# Patient Record
Sex: MALE | NOT HISPANIC OR LATINO | Employment: FULL TIME | URBAN - METROPOLITAN AREA
[De-identification: names, ages, dates, MRNs, and addresses within clinical notes are randomized per-mention and may not be internally consistent; named-entity substitution may affect disease eponyms.]

---

## 2020-07-23 ENCOUNTER — OFFICE VISIT CONVERTED (OUTPATIENT)
Dept: NEUROSURGERY | Facility: CLINIC | Age: 35
End: 2020-07-23
Attending: PHYSICIAN ASSISTANT

## 2020-08-06 ENCOUNTER — OFFICE VISIT CONVERTED (OUTPATIENT)
Dept: NEUROSURGERY | Facility: CLINIC | Age: 35
End: 2020-08-06
Attending: PHYSICIAN ASSISTANT

## 2020-08-20 ENCOUNTER — OFFICE VISIT CONVERTED (OUTPATIENT)
Dept: NEUROSURGERY | Facility: CLINIC | Age: 35
End: 2020-08-20
Attending: PHYSICIAN ASSISTANT

## 2020-09-04 ENCOUNTER — HOSPITAL ENCOUNTER (OUTPATIENT)
Dept: PREADMISSION TESTING | Facility: HOSPITAL | Age: 35
Discharge: HOME | End: 2020-09-04
Attending: NEUROLOGICAL SURGERY

## 2020-09-05 LAB — SARS-COV-2 RNA SPEC QL NAA+PROBE: NOT DETECTED

## 2020-09-09 ENCOUNTER — HOSPITAL ENCOUNTER (OUTPATIENT)
Dept: PERIOP | Facility: HOSPITAL | Age: 35
Setting detail: HOSPITAL OUTPATIENT SURGERY
Discharge: HOME | End: 2020-09-09
Attending: NEUROLOGICAL SURGERY

## 2020-09-29 ENCOUNTER — CONVERSION ENCOUNTER (OUTPATIENT)
Dept: NEUROLOGY | Facility: CLINIC | Age: 35
End: 2020-09-29

## 2020-09-29 ENCOUNTER — OFFICE VISIT CONVERTED (OUTPATIENT)
Dept: NEUROSURGERY | Facility: CLINIC | Age: 35
End: 2020-09-29
Attending: PHYSICIAN ASSISTANT

## 2020-10-16 ENCOUNTER — OFFICE VISIT CONVERTED (OUTPATIENT)
Dept: NEUROSURGERY | Facility: CLINIC | Age: 35
End: 2020-10-16
Attending: PHYSICIAN ASSISTANT

## 2020-10-16 ENCOUNTER — CONVERSION ENCOUNTER (OUTPATIENT)
Dept: NEUROLOGY | Facility: CLINIC | Age: 35
End: 2020-10-16

## 2020-11-11 ENCOUNTER — OFFICE VISIT CONVERTED (OUTPATIENT)
Dept: NEUROSURGERY | Facility: CLINIC | Age: 35
End: 2020-11-11
Attending: PHYSICIAN ASSISTANT

## 2021-01-25 ENCOUNTER — OFFICE VISIT (OUTPATIENT)
Dept: GASTROENTEROLOGY | Facility: CLINIC | Age: 36
End: 2021-01-25

## 2021-01-25 VITALS — HEIGHT: 72 IN | WEIGHT: 199 LBS | BODY MASS INDEX: 26.95 KG/M2 | TEMPERATURE: 98.5 F

## 2021-01-25 DIAGNOSIS — R10.30 LOWER ABDOMINAL PAIN: ICD-10-CM

## 2021-01-25 DIAGNOSIS — K59.1 FUNCTIONAL DIARRHEA: Primary | ICD-10-CM

## 2021-01-25 PROCEDURE — 99204 OFFICE O/P NEW MOD 45 MIN: CPT | Performed by: INTERNAL MEDICINE

## 2021-01-25 NOTE — PROGRESS NOTES
Chief Complaint   Patient presents with   • Diarrhea     Subjective     HPI  Jan Garcia is a 35 y.o. male who presents today for new office visit  35-year-old gentleman with greater than 10 years of diarrhea, loose stools  He also describes lower abdominal cramping that does not radiate worse after eating better after passing gas and there a bowel movement  There has been no weight loss  No upper GI symptoms such as nausea or vomiting  He has no rectal bleeding    Objective   Vitals:    01/25/21 1448   Temp: 98.5 °F (36.9 °C)       Physical Exam  Vitals signs and nursing note reviewed.   Constitutional:       Appearance: He is well-developed.   HENT:      Head: Normocephalic and atraumatic.   Eyes:      Conjunctiva/sclera: Conjunctivae normal.   Neck:      Musculoskeletal: Normal range of motion.      Trachea: No tracheal deviation.   Pulmonary:      Effort: Pulmonary effort is normal. No respiratory distress.   Abdominal:      General: Bowel sounds are normal. There is no distension.      Palpations: Abdomen is soft. There is no mass.      Tenderness: There is no abdominal tenderness. There is no guarding or rebound.   Genitourinary:     Rectum: Normal. No anal fissure, external hemorrhoid or internal hemorrhoid. Normal anal tone.   Musculoskeletal: Normal range of motion.   Skin:     General: Skin is warm and dry.   Neurological:      Mental Status: He is alert and oriented to person, place, and time.   Psychiatric:         Judgment: Judgment normal.         Assessment/Plan   Assessment:     Diarrhea  Abdominal pain    Plan:     We will perform a CBC, CMP and celiac profile  I will schedule him for colonoscopy with intubation of the terminal ileum to look for ileal Crohn's disease, and biopsies of the entire colon to look for microscopic colitis  If the diagnosis here really is IBS-D I would envision treating him with Xifaxan for 2 weeks followed by a probiotic for 1 month  In the future he may need  antispasmodics versus TCA for IBS-D  Consider low FODMAP diet in the future    I spent 45 minutes caring for Jan on this date of service. This time includes time spent by me in the following activities:preparing for the visit, reviewing tests, obtaining and/or reviewing a separately obtained history, performing a medically appropriate examination and/or evaluation , counseling and educating the patient/family/caregiver, ordering medications, tests, or procedures, referring and communicating with other health care professionals , independently interpreting results and communicating that information with the patient/family/caregiver and care coordination    Dominic Taylor MD  [facility]  [address] [place] [address]  Office: [phone]

## 2021-01-26 ENCOUNTER — PREP FOR SURGERY (OUTPATIENT)
Dept: OTHER | Facility: HOSPITAL | Age: 36
End: 2021-01-26

## 2021-01-26 DIAGNOSIS — K90.0 CELIAC DISEASE: Primary | ICD-10-CM

## 2021-01-26 LAB
ALBUMIN SERPL-MCNC: 4.5 G/DL (ref 3.5–5.2)
ALBUMIN/GLOB SERPL: 2.4 G/DL
ALP SERPL-CCNC: 66 U/L (ref 39–117)
ALT SERPL-CCNC: 22 U/L (ref 1–41)
AST SERPL-CCNC: 22 U/L (ref 1–40)
BASOPHILS # BLD AUTO: 0.02 10*3/MM3 (ref 0–0.2)
BASOPHILS NFR BLD AUTO: 0.4 % (ref 0–1.5)
BILIRUB SERPL-MCNC: 0.7 MG/DL (ref 0–1.2)
BUN SERPL-MCNC: 15 MG/DL (ref 6–20)
BUN/CREAT SERPL: 13.8 (ref 7–25)
CALCIUM SERPL-MCNC: 9.6 MG/DL (ref 8.6–10.5)
CHLORIDE SERPL-SCNC: 104 MMOL/L (ref 98–107)
CO2 SERPL-SCNC: 25.3 MMOL/L (ref 22–29)
CREAT SERPL-MCNC: 1.09 MG/DL (ref 0.76–1.27)
ENDOMYSIUM IGA SER QL: NEGATIVE
EOSINOPHIL # BLD AUTO: 0.26 10*3/MM3 (ref 0–0.4)
EOSINOPHIL NFR BLD AUTO: 5.5 % (ref 0.3–6.2)
ERYTHROCYTE [DISTWIDTH] IN BLOOD BY AUTOMATED COUNT: 12.1 % (ref 12.3–15.4)
GLIADIN PEPTIDE IGA SER-ACNC: 8 UNITS (ref 0–19)
GLIADIN PEPTIDE IGG SER-ACNC: 3 UNITS (ref 0–19)
GLOBULIN SER CALC-MCNC: 1.9 GM/DL
GLUCOSE SERPL-MCNC: 106 MG/DL (ref 65–99)
HCT VFR BLD AUTO: 48.1 % (ref 37.5–51)
HGB BLD-MCNC: 15.9 G/DL (ref 13–17.7)
IGA SERPL-MCNC: 141 MG/DL (ref 90–386)
IMM GRANULOCYTES # BLD AUTO: 0.01 10*3/MM3 (ref 0–0.05)
IMM GRANULOCYTES NFR BLD AUTO: 0.2 % (ref 0–0.5)
LYMPHOCYTES # BLD AUTO: 1.39 10*3/MM3 (ref 0.7–3.1)
LYMPHOCYTES NFR BLD AUTO: 29.1 % (ref 19.6–45.3)
MCH RBC QN AUTO: 29.3 PG (ref 26.6–33)
MCHC RBC AUTO-ENTMCNC: 33.1 G/DL (ref 31.5–35.7)
MCV RBC AUTO: 88.7 FL (ref 79–97)
MONOCYTES # BLD AUTO: 0.46 10*3/MM3 (ref 0.1–0.9)
MONOCYTES NFR BLD AUTO: 9.6 % (ref 5–12)
NEUTROPHILS # BLD AUTO: 2.63 10*3/MM3 (ref 1.7–7)
NEUTROPHILS NFR BLD AUTO: 55.2 % (ref 42.7–76)
NRBC BLD AUTO-RTO: 0 /100 WBC (ref 0–0.2)
PLATELET # BLD AUTO: 278 10*3/MM3 (ref 140–450)
POTASSIUM SERPL-SCNC: 4.3 MMOL/L (ref 3.5–5.2)
PROT SERPL-MCNC: 6.4 G/DL (ref 6–8.5)
RBC # BLD AUTO: 5.42 10*6/MM3 (ref 4.14–5.8)
SODIUM SERPL-SCNC: 142 MMOL/L (ref 136–145)
TTG IGA SER-ACNC: <2 U/ML (ref 0–3)
TTG IGG SER-ACNC: 10 U/ML (ref 0–5)
WBC # BLD AUTO: 4.77 10*3/MM3 (ref 3.4–10.8)

## 2021-01-26 NOTE — PROGRESS NOTES
1 antibody positive on the celiac panel  Please add EGD to his already scheduled colonoscopy  Case request is in for EGD

## 2021-02-17 ENCOUNTER — TRANSCRIBE ORDERS (OUTPATIENT)
Dept: ADMINISTRATIVE | Facility: HOSPITAL | Age: 36
End: 2021-02-17

## 2021-02-17 DIAGNOSIS — Z01.818 OTHER SPECIFIED PRE-OPERATIVE EXAMINATION: Primary | ICD-10-CM

## 2021-02-19 ENCOUNTER — TELEPHONE (OUTPATIENT)
Dept: GASTROENTEROLOGY | Facility: CLINIC | Age: 36
End: 2021-02-19

## 2021-02-19 ENCOUNTER — PREP FOR SURGERY (OUTPATIENT)
Dept: OTHER | Facility: HOSPITAL | Age: 36
End: 2021-02-19

## 2021-02-19 ENCOUNTER — LAB (OUTPATIENT)
Dept: LAB | Facility: HOSPITAL | Age: 36
End: 2021-02-19

## 2021-02-19 DIAGNOSIS — Z01.818 OTHER SPECIFIED PRE-OPERATIVE EXAMINATION: ICD-10-CM

## 2021-02-19 PROCEDURE — C9803 HOPD COVID-19 SPEC COLLECT: HCPCS

## 2021-02-19 PROCEDURE — U0004 COV-19 TEST NON-CDC HGH THRU: HCPCS

## 2021-02-19 NOTE — TELEPHONE ENCOUNTER
----- Message from Javan Colbert Rep sent at 2/18/2021  1:33 PM EST -----  Regarding: EGD added  Mandy,    This pt had a EGD added. Maki at  says they only show the colonoscopy. I submitted both to [employer]. The colonoscopy was approved. The EGD is pending. I let Maki know!  Thanks  Cis

## 2021-02-20 LAB — SARS-COV-2 ORF1AB RESP QL NAA+PROBE: NOT DETECTED

## 2021-02-22 ENCOUNTER — ANESTHESIA (OUTPATIENT)
Dept: GASTROENTEROLOGY | Facility: HOSPITAL | Age: 36
End: 2021-02-22

## 2021-02-22 ENCOUNTER — ANESTHESIA EVENT (OUTPATIENT)
Dept: GASTROENTEROLOGY | Facility: HOSPITAL | Age: 36
End: 2021-02-22

## 2021-02-22 ENCOUNTER — HOSPITAL ENCOUNTER (OUTPATIENT)
Facility: HOSPITAL | Age: 36
Setting detail: HOSPITAL OUTPATIENT SURGERY
Discharge: HOME | End: 2021-02-22
Attending: INTERNAL MEDICINE | Admitting: INTERNAL MEDICINE

## 2021-02-22 VITALS
TEMPERATURE: 98.1 F | BODY MASS INDEX: 26.37 KG/M2 | DIASTOLIC BLOOD PRESSURE: 81 MMHG | RESPIRATION RATE: 16 BRPM | OXYGEN SATURATION: 100 % | HEART RATE: 55 BPM | WEIGHT: 194.7 LBS | HEIGHT: 72 IN | SYSTOLIC BLOOD PRESSURE: 116 MMHG

## 2021-02-22 DIAGNOSIS — R10.30 LOWER ABDOMINAL PAIN: ICD-10-CM

## 2021-02-22 DIAGNOSIS — K59.1 FUNCTIONAL DIARRHEA: ICD-10-CM

## 2021-02-22 PROCEDURE — 88305 TISSUE EXAM BY PATHOLOGIST: CPT | Performed by: INTERNAL MEDICINE

## 2021-02-22 PROCEDURE — 25010000002 PROPOFOL 10 MG/ML EMULSION: Performed by: ANESTHESIOLOGY

## 2021-02-22 PROCEDURE — 43239 EGD BIOPSY SINGLE/MULTIPLE: CPT | Performed by: INTERNAL MEDICINE

## 2021-02-22 PROCEDURE — 45380 COLONOSCOPY AND BIOPSY: CPT | Performed by: INTERNAL MEDICINE

## 2021-02-22 RX ORDER — HYDROMORPHONE HCL 110MG/55ML
0.5 PATIENT CONTROLLED ANALGESIA SYRINGE INTRAVENOUS
Status: DISCONTINUED | OUTPATIENT
Start: 2021-02-22 | End: 2021-02-22 | Stop reason: HOSPADM

## 2021-02-22 RX ORDER — SODIUM CHLORIDE, SODIUM LACTATE, POTASSIUM CHLORIDE, CALCIUM CHLORIDE 600; 310; 30; 20 MG/100ML; MG/100ML; MG/100ML; MG/100ML
1000 INJECTION, SOLUTION INTRAVENOUS CONTINUOUS
Status: DISCONTINUED | OUTPATIENT
Start: 2021-02-22 | End: 2021-02-22 | Stop reason: HOSPADM

## 2021-02-22 RX ORDER — LABETALOL HYDROCHLORIDE 5 MG/ML
5 INJECTION, SOLUTION INTRAVENOUS
Status: DISCONTINUED | OUTPATIENT
Start: 2021-02-22 | End: 2021-02-22 | Stop reason: HOSPADM

## 2021-02-22 RX ORDER — PROPOFOL 10 MG/ML
VIAL (ML) INTRAVENOUS AS NEEDED
Status: DISCONTINUED | OUTPATIENT
Start: 2021-02-22 | End: 2021-02-22 | Stop reason: SURG

## 2021-02-22 RX ORDER — EPHEDRINE SULFATE 50 MG/ML
5 INJECTION, SOLUTION INTRAVENOUS ONCE AS NEEDED
Status: DISCONTINUED | OUTPATIENT
Start: 2021-02-22 | End: 2021-02-22 | Stop reason: HOSPADM

## 2021-02-22 RX ORDER — LIDOCAINE HYDROCHLORIDE 10 MG/ML
0.5 INJECTION, SOLUTION INFILTRATION; PERINEURAL ONCE AS NEEDED
Status: DISCONTINUED | OUTPATIENT
Start: 2021-02-22 | End: 2021-02-22 | Stop reason: HOSPADM

## 2021-02-22 RX ORDER — HYDROCODONE BITARTRATE AND ACETAMINOPHEN 7.5; 325 MG/1; MG/1
1 TABLET ORAL ONCE AS NEEDED
Status: DISCONTINUED | OUTPATIENT
Start: 2021-02-22 | End: 2021-02-22 | Stop reason: HOSPADM

## 2021-02-22 RX ORDER — DIPHENHYDRAMINE HCL 25 MG
25 CAPSULE ORAL
Status: DISCONTINUED | OUTPATIENT
Start: 2021-02-22 | End: 2021-02-22 | Stop reason: HOSPADM

## 2021-02-22 RX ORDER — PROMETHAZINE HYDROCHLORIDE 25 MG/1
25 SUPPOSITORY RECTAL ONCE AS NEEDED
Status: DISCONTINUED | OUTPATIENT
Start: 2021-02-22 | End: 2021-02-22 | Stop reason: HOSPADM

## 2021-02-22 RX ORDER — LIDOCAINE HYDROCHLORIDE 20 MG/ML
INJECTION, SOLUTION INFILTRATION; PERINEURAL AS NEEDED
Status: DISCONTINUED | OUTPATIENT
Start: 2021-02-22 | End: 2021-02-22 | Stop reason: SURG

## 2021-02-22 RX ORDER — NALOXONE HCL 0.4 MG/ML
0.2 VIAL (ML) INJECTION AS NEEDED
Status: DISCONTINUED | OUTPATIENT
Start: 2021-02-22 | End: 2021-02-22 | Stop reason: HOSPADM

## 2021-02-22 RX ORDER — PROPOFOL 10 MG/ML
VIAL (ML) INTRAVENOUS CONTINUOUS PRN
Status: DISCONTINUED | OUTPATIENT
Start: 2021-02-22 | End: 2021-02-22 | Stop reason: SURG

## 2021-02-22 RX ORDER — OXYCODONE AND ACETAMINOPHEN 7.5; 325 MG/1; MG/1
1 TABLET ORAL ONCE AS NEEDED
Status: DISCONTINUED | OUTPATIENT
Start: 2021-02-22 | End: 2021-02-22 | Stop reason: HOSPADM

## 2021-02-22 RX ORDER — SODIUM CHLORIDE 0.9 % (FLUSH) 0.9 %
10 SYRINGE (ML) INJECTION AS NEEDED
Status: DISCONTINUED | OUTPATIENT
Start: 2021-02-22 | End: 2021-02-22 | Stop reason: HOSPADM

## 2021-02-22 RX ORDER — FLUMAZENIL 0.1 MG/ML
0.2 INJECTION INTRAVENOUS AS NEEDED
Status: DISCONTINUED | OUTPATIENT
Start: 2021-02-22 | End: 2021-02-22 | Stop reason: HOSPADM

## 2021-02-22 RX ORDER — DIPHENHYDRAMINE HYDROCHLORIDE 50 MG/ML
12.5 INJECTION INTRAMUSCULAR; INTRAVENOUS
Status: DISCONTINUED | OUTPATIENT
Start: 2021-02-22 | End: 2021-02-22 | Stop reason: HOSPADM

## 2021-02-22 RX ORDER — ONDANSETRON 2 MG/ML
4 INJECTION INTRAMUSCULAR; INTRAVENOUS ONCE AS NEEDED
Status: DISCONTINUED | OUTPATIENT
Start: 2021-02-22 | End: 2021-02-22 | Stop reason: HOSPADM

## 2021-02-22 RX ORDER — PROMETHAZINE HYDROCHLORIDE 25 MG/1
25 TABLET ORAL ONCE AS NEEDED
Status: DISCONTINUED | OUTPATIENT
Start: 2021-02-22 | End: 2021-02-22 | Stop reason: HOSPADM

## 2021-02-22 RX ORDER — FENTANYL CITRATE 50 UG/ML
50 INJECTION, SOLUTION INTRAMUSCULAR; INTRAVENOUS
Status: DISCONTINUED | OUTPATIENT
Start: 2021-02-22 | End: 2021-02-22 | Stop reason: HOSPADM

## 2021-02-22 RX ADMIN — PROPOFOL 100 MG: 10 INJECTION, EMULSION INTRAVENOUS at 09:59

## 2021-02-22 RX ADMIN — PROPOFOL 140 MCG/KG/MIN: 10 INJECTION, EMULSION INTRAVENOUS at 09:59

## 2021-02-22 RX ADMIN — LIDOCAINE HYDROCHLORIDE 100 MG: 20 INJECTION, SOLUTION INFILTRATION; PERINEURAL at 09:58

## 2021-02-22 RX ADMIN — SODIUM CHLORIDE, POTASSIUM CHLORIDE, SODIUM LACTATE AND CALCIUM CHLORIDE 1000 ML: 600; 310; 30; 20 INJECTION, SOLUTION INTRAVENOUS at 09:55

## 2021-02-22 NOTE — ANESTHESIA PREPROCEDURE EVALUATION
" Anesthesia Evaluation     Patient summary reviewed and Nursing notes reviewed   no history of anesthetic complications:  NPO Solid Status: > 8 hours  NPO Liquid Status: > 2 hours           Airway   Mallampati: I  TM distance: >3 FB  Neck ROM: full  Dental - normal exam     Pulmonary - normal exam   (+) a smoker Former,   Cardiovascular - negative cardio ROS and normal exam  Exercise tolerance: good (4-7 METS)    Rhythm: regular        Neuro/Psych  (+) psychiatric history Anxiety and ADHD,     GI/Hepatic/Renal/Endo - negative ROS     Musculoskeletal (-) negative ROS    Abdominal  - normal exam    Bowel sounds: normal.   Substance History - negative use     OB/GYN negative ob/gyn ROS         Other                        Anesthesia Plan    ASA 2     MAC   (/83 (BP Location: Left arm, Patient Position: Sitting)   Pulse 56   Temp 36.7 °C (98.1 °F) (Oral)   Resp 16   Ht 182.9 cm (72\")   Wt 88.3 kg (194 lb 11.2 oz)   SpO2 96%   BMI 26.41 kg/m²     I have reviewed the patient's history with the patient and the chart, including all pertinent laboratory results and imaging. I have explained the risks of anesthesia including but not limited to dental damage, corneal abrasion, nerve injury, MI, stroke, and death.  )    Anesthetic plan, all risks, benefits, and alternatives have been provided, discussed and informed consent has been obtained with: patient.      "

## 2021-02-22 NOTE — DISCHARGE INSTRUCTIONS

## 2021-02-22 NOTE — ANESTHESIA POSTPROCEDURE EVALUATION
"Patient: Jan Garcia    Procedure Summary     Date: 02/22/21 Room / Location: [facility] ENDOSCOPY 8 / [facility] ENDOSCOPY    Anesthesia Start: 0957 Anesthesia Stop: 1021    Procedures:       COLONOSCOPY to cecum and TI with cold bx and cold polypectomy (N/A )      ESOPHAGOGASTRODUODENOSCOPY with cold bx (N/A Esophagus) Diagnosis:       Functional diarrhea      Lower abdominal pain      (Functional diarrhea [K59.1])      (Lower abdominal pain [R10.30])    Surgeon: Dominic Taylor MD Provider: Rufina Lloyd MD    Anesthesia Type: MAC ASA Status: 2          Anesthesia Type: MAC    Vitals  Vitals Value Taken Time   BP 97/62 02/22/21 1024   Temp     Pulse 56 02/22/21 1024   Resp 16 02/22/21 1024   SpO2 92 % 02/22/21 1024           Post Anesthesia Care and Evaluation    Patient location during evaluation: PACU  Patient participation: complete - patient participated  Level of consciousness: sleepy but conscious  Pain management: adequate  Airway patency: patent  Anesthetic complications: No anesthetic complications    Cardiovascular status: acceptable  Respiratory status: acceptable  Hydration status: acceptable    Comments: BP 97/62   Pulse 56   Temp 36.7 °C (98.1 °F) (Oral)   Resp 16   Ht 182.9 cm (72\")   Wt 88.3 kg (194 lb 11.2 oz)   SpO2 92%   BMI 26.41 kg/m²         "

## 2021-02-23 LAB
CYTO UR: NORMAL
LAB AP CASE REPORT: NORMAL
PATH REPORT.FINAL DX SPEC: NORMAL
PATH REPORT.GROSS SPEC: NORMAL

## 2021-02-23 NOTE — PROGRESS NOTES
Letter adenoma colon polyp  Colonoscopy recall 5 years  Continue IBgard as needed  Office visit Mary Kay next available

## 2021-04-01 ENCOUNTER — TELEPHONE (OUTPATIENT)
Dept: GASTROENTEROLOGY | Facility: CLINIC | Age: 36
End: 2021-04-01

## 2021-04-01 NOTE — TELEPHONE ENCOUNTER
----- Message from Dominic Taylor MD sent at 2/23/2021  1:56 PM EST -----  Letter adenoma colon polyp  Colonoscopy recall 5 years  Continue IBgard as needed  Office visit Mary Kay rowland

## 2021-04-01 NOTE — TELEPHONE ENCOUNTER
"Patient called. Advised as per Dr. Taylor's note. Patient states he was supposed to get a prescription medication from Dr. Taylor. Advised the medication IBgard is an over the counter medication and can be purchased at [employer] or [facility].   Patient then stated he was not happy he had not received a phone call from the office with a week of his procedure(he states \"someone\" told him this). Patient states again he was not happy and will not come back.   Patient was asked if he had called the office for his results. He stated, \"that is not my job\" and hung the phone up.   Update to Kisha Baez .   "

## 2021-04-05 NOTE — TELEPHONE ENCOUNTER
Dominic Tavera MD Haag, Shelley D, RN; Di Baez; Marichuy Patton, RN  Caller: Unspecified (4 days ago,  2:52 PM)  I think the most important thing to consider with this gentleman is at age 35 he had a precancerous polyp, we have now significantly reduced his risk of colon cancer by curing that precancerous polyp, plus it sets him up for another colonoscopy in 5 years to remove her more precancerous polyps if he has any, the fact that he made a precancerous polyp at such an early age increases his risk of making more precancerous polyps as his life goes on     if you would like to let him know that that would be great.  If you get a voicemail you can leave that on his voicemail    His diagnosis is likely IBS, I have ruled out many other diagnoses with the work-up of done so far, IBgard is a good start please let him know this    The FDA approved treatments for irritable bowel syndrome include xifaxan  And Lotronex and Viberzi     None of these will be needed if it is mild IBS controlled with IBgard... if it is not controlled with IBgard then we will start 1 of these medications likely Xifaxan    thanks     Called pt and left a msg on identified vm and advised of Dr. Taylor's note.  Advised pt to call back if he has questions.
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
## 2021-05-13 NOTE — PROGRESS NOTES
Progress Note      Patient Name: Jan Garcia   Patient ID: [id number]   Sex: Female   YOB: 1985    Referring Provider: [facility]    Visit Date: August 6, 2020    Provider: Tania Duarte PA-C   Location: [facility]   Location Address: [address] [place] [address]  [id number]   Location Phone: [phone]          Chief Complaint  · Follow-up      History Of Present Illness  The patient is a 34 year old female who is in the office for followup appointment.      He fractured his neck as a child and the fracture wasn't discovered until he was a teenager.  Neck pain constant and worse with prolonged sitting without a back rest.  He has more pain with neck extension.  Denies arm pain or weakness.  Has hand paresthesias that wakes him up at night.  Imaging of cervical spine was several years ago.     MRI lumbar spine showed a small right L3/4 foraminal disc protrusion.       Past Medical History  Arthritis; Back pain; Degenerative Disc Disease ; Leg pain; Migraines; Muscle cramps; Vascular disease, peripheral         Past Surgical History  Vasectomy         Medication List  Adderall 15 mg oral tablet; clonazepam 1 mg oral tablet; meloxicam 15 mg oral tablet         Allergy List  NO KNOWN DRUG ALLERGIES       Allergies Reconciled  Family Medical History  Family history of bleeding disorder; Family history of Arthritis; Family history of stroke; Family history of heart disease         Social History  Alcohol (Current some day); lives alone; Single; Tobacco (Former); Working         Review of Systems  · Constitutional  o Denies  o : chills, excessive sweating, fatigue, fever, sycope/passing out, weight gain, weight loss  · Eyes  o Denies  o : changes in vision, blurry vision, double vision  · HENT  o Admits  o : seasonal allergies  o Denies  o : loss of hearing, ringing in the ears, ear aches, sore throat, nasal congestion, sinus pain, nose  bleeds  · Cardiovascular  o Denies  o : blood clots, swollen legs, anemia, easy burising or bleeding, transfusions  · Respiratory  o Denies  o : shortness of breath, dry cough, productive cough, pneumonia, COPD  · Gastrointestinal  o Denies  o : difficulty swallowing, reflux  · Genitourinary  o Denies  o : incontinence  · Neurologic  o Admits  o : headache, difficulty with sleep, numbness/tingling/paresthesia , weakness  o Denies  o : seizure, stroke, tremor, loss of balance, falls, dizziness/vertigo, difficulty with dexterity  · Musculoskeletal  o Admits  o : neck stiffness/pain, muscle aches, joint pain, weakness, spasms, sciatica, pain radiating in leg, low back pain  o Denies  o : swollen lymph nodes, pain radiating in arm  · Endocrine  o Denies  o : diabetes, thyroid disorder  · Psychiatric  o Admits  o : anxiety, depression  · All Others Negative      Vitals  Date Time BP Position Site L\R Cuff Size HR RR TEMP (F) WT  HT  BMI kg/m2 BSA m2 O2 Sat        08/06/2020 11:11 AM        97.6 197lbs 5oz 6'   26.76 2.13           Physical Examination  · Constitutional  o Appearance  o : well-nourished, well developed, alert, in no acute distress  · Respiratory  o Respiratory Effort  o : breathing unlabored  · Cardiovascular  o Peripheral Vascular System  o :   § Extremities  § : no cyanosis, clubbing or edema  · Neurologic  o Mental Status Examination  o :   § Orientation  § : grossly oriented to person, place and time  o Motor Examination  o :   § RLE Strength  § : strength normal  § RLE Motor Function  § : tone normal, no atrophy, no abnormal movements noted  § LLE Strength  § : strength normal  § LLE Motor Function  § : tone normal, no atrophy, no abnormal movements noted  o Reflexes  o :   § RLE  § : 2/4 knee and ankle reflex  § LLE  § : 2/4 knee and ankle reflex  o Sensation  o :   § Light Touch  § : sensation intact to light touch in extremities  o Gait and Station  o :   § Gait Screening  § : gait within normal  limits  · Psychiatric  o Mood and Affect  o : mood normal, affect appropriate     Negative Phalen's and Tinel's at the wrists.  Motor 5/5 and sensation intact in the upper extremities.  Negative Hudson's.           Assessment  · Displacement of lumbar intervertebral disc     722.10/M51.26  · Lumbago/low back pain     724.3/M54.40  · Lumbar Spinal Stenosis     724.02/M48.06  · Paresthesia     782.0/R20.2  · Radiculopathy, lumbosacral     724.4/M54.16  · History of cervical fracture     V15.51/Z87.81  · Cervicalgia     723.1/M54.2    Problems Reconciled  Plan  · Orders  o MRI cervical spine w/o contrast (27616) - V15.51/Z87.81, 723.1/M54.2, 782.0/R20.2 - 08/06/2020   [facility] in [place] later afternoon appt.   · Medications  o Medications have been Reconciled  o Transition of Care or Provider Policy  · Instructions  o The ROS and the PFSH were reviewed at today's visit.  o Call or return to office if symptoms worsen or persist.   o I will order MRI cervical spine and f/u to discuss results. I will add to current work restrictions. Lumbar surgery not recommended.             Electronically Signed by: Tania Duarte PA-C -Author on August 6, 2020 12:01:26 PM

## 2021-05-13 NOTE — PROGRESS NOTES
Progress Note      Patient Name: Jan Garcia   Patient ID: [id number]   Sex: Female   YOB: 1985    Referring Provider: Shima Noland GYN    Visit Date: August 20, 2020    Provider: Tania Duarte PA-C   Location: [facility]   Location Address: [address] [place] [address]  [id number]   Location Phone: [phone]          Chief Complaint     Patient is here to discuss MRI results.       History Of Present Illness     MRI cervical spine showed mild foraminal stenosis from small disc protrusions at C5/6 and C6/7.  MRI lumbar spine discussed at last visit showed a right L3/4 foraminal disc protrusion.  Right leg pain has progressed and now down to the foot.  He missed work yesterday due to the pain and would like to take a leave from work and consider lumbar surgery.  He's failed LESB and PT. Hand paresthesias that wake him up at night and worse in the thumb, index and middle fingers.       Past Medical History  Arthritis; Back pain; Degenerative Disc Disease ; Leg pain; Migraines; Muscle cramps; Vascular disease, peripheral         Past Surgical History  Vasectomy         Medication List  Adderall 15 mg oral tablet; clonazepam 1 mg oral tablet; meloxicam 15 mg oral tablet         Allergy List  NO KNOWN DRUG ALLERGIES       Allergies Reconciled  Family Medical History  Family history of bleeding disorder; Family history of Arthritis; Family history of stroke; Family history of heart disease         Social History  Alcohol (Current some day); lives alone; Single; Tobacco (Former); Working         Review of Systems  · Constitutional  o Denies  o : chills, excessive sweating, fatigue, fever, sycope/passing out, weight gain, weight loss  · Eyes  o Denies  o : changes in vision, blurry vision, double vision  · HENT  o Admits  o : seasonal allergies  o Denies  o : loss of hearing, ringing in the ears, ear aches, sore throat, nasal congestion, sinus pain, nose  bleeds  · Cardiovascular  o Denies  o : blood clots, swollen legs, anemia, easy burising or bleeding, transfusions  · Respiratory  o Denies  o : shortness of breath, dry cough, productive cough, pneumonia, COPD  · Gastrointestinal  o Denies  o : difficulty swallowing, reflux  · Genitourinary  o Denies  o : incontinence  · Neurologic  o Admits  o : headache, difficulty with sleep, numbness/tingling/paresthesia , weakness  o Denies  o : seizure, stroke, tremor, loss of balance, falls, dizziness/vertigo, difficulty with dexterity  · Musculoskeletal  o Admits  o : neck stiffness/pain, muscle aches, joint pain, weakness, spasms, sciatica, pain radiating in leg, low back pain  o Denies  o : swollen lymph nodes, pain radiating in arm  · Endocrine  o Denies  o : diabetes, thyroid disorder  · Psychiatric  o Admits  o : anxiety, depression  · All Others Negative      Vitals  Date Time BP Position Site L\R Cuff Size HR RR TEMP (F) WT  HT  BMI kg/m2 BSA m2 O2 Sat        08/20/2020 03:43 PM        97.6 193lbs 0oz 6'   26.18 2.11           Physical Examination  · Constitutional  o Appearance  o : well-nourished, well developed, alert, in no acute distress  · Respiratory  o Respiratory Effort  o : breathing unlabored  · Cardiovascular  o Peripheral Vascular System  o :   § Extremities  § : no cyanosis, clubbing or edema  · Neurologic  o Mental Status Examination  o :   § Orientation  § : grossly oriented to person, place and time  o Motor Examination  o :   § RLE Strength  § : strength normal  § RLE Motor Function  § : tone normal, no atrophy, no abnormal movements noted  § LLE Strength  § : strength normal  § LLE Motor Function  § : tone normal, no atrophy, no abnormal movements noted  o Reflexes  o :   § RLE  § : 1/4 knee and ankle reflex  § LLE  § : 2/4 knee and ankle reflex  o Sensation  o :   § Light Touch  § : sensation intact to light touch in extremities  o Gait and Station  o :   § Gait Screening  § : gait within normal  limits  · Psychiatric  o Mood and Affect  o : mood normal, affect appropriate     Positive Phalen's at the wrists but negative Tinel's.   Motor 5/5 and sensation intact in the upper extremities.  Negative Hudson's.           Assessment  · Pre-op examination     V72.84/Z01.818  · Foraminal stenosis of lumbar region     724.02/M99.83  right L3/4  · Lumbago/low back pain     724.3/M54.40  · Lumbar disc herniation, L3-4     722.10/M51.26  right far lateral  · Lumbar Spinal Stenosis     724.02/M48.06  · Radiculopathy, lumbosacral     724.4/M54.16  · Cervicalgia     723.1/M54.2  · Hand paresthesia     782.0/R20.2      Plan  · Orders  o EMG/NCV of Upper Extremities Bilaterally (40361) - 723.1/M54.2 - 08/20/2020   concern for carpal tunnel  · Medications  o Medications have been Reconciled  o Transition of Care or Provider Policy  · Instructions  o ****Surgical Orders****  o Outpatient  o RISK AND BENEFITS:  o Possible risks/complications, benefits and alternatives to surgical or invasive procedure have been explained to the patient and/or legal guardian.  o Patient has been evaluated and can tolerate anesthesia and/or sedation. Risks, benefits, and alternatives to anesthesia and sedation have been explained to the patient and/or legal guardian.  o ***************  o PREP: Per protocol;  o IV: Per Anesthesia;  o *******************************************  o PRE- OP MEDICATION ORDER:  o *******************************************  o Kefzol 2 grams IV on call to OR.  o Date of Surgical Procedure:   o Please sign permit for:  o Lumbar Microdiscectomy, right approach at lumbar three to lumbar four for far lateral disc herniation  o The above History and Physical must have been completed within 30 days of admission.  o He will have an EMG/NCV of the upper extremities to evaluate for carpal tunnel. Small disc protrusions at C5/6 and C6/7 but surgery not recommended for the cervical spine. He has a right L3/4 foraminal disc  protrusion. Dr. Castaneda also saw him today in clinic. He discussed with him the risks and benefits of a right L3/4 MID for far lateral disc herniation. He has elected to proceed with surgery. Off work from today until 6 weeks after surgery.   · Associate Tasks  o Task ID [id number] General Task: please schedule patient for surgery            Electronically Signed by: LIBIA Walls-FALGUNI -Author on August 20, 2020 05:05:49 PM  Electronically Co-signed by: Aden Castaneda MD -Reviewer on August 25, 2020 09:18:15 AM

## 2021-05-13 NOTE — PROGRESS NOTES
Progress Note      Patient Name: Jan Garcia   Patient ID: [id number]   Sex: Male   YOB: 1985    Referring Provider: Shima Noland GYN    Visit Date: September 29, 2020    Provider: Nadeen Rodriguez PA-C   Location: [facility]   Location Address: [address] [place] [address]  [id number]   Location Phone: [phone]          Chief Complaint     Post Op follow up       History Of Present Illness     He had a right L3/4 MID. Pt notes that his right leg pain has been worse/severe since surgery. Surgery only helped low back pain. He denies signs of infection and fever.       Past Medical History  Arthritis; Back pain; Degenerative Disc Disease ; Leg pain; Migraines; Muscle cramps; Vascular disease, peripheral         Past Surgical History  Far lateral discectomy; Vasectomy         Medication List  Adderall 15 mg oral tablet; clonazepam 1 mg oral tablet; gabapentin 300 mg oral capsule; meloxicam 15 mg oral tablet         Allergy List  NO KNOWN DRUG ALLERGIES         Family Medical History  Family history of bleeding disorder; Family history of Arthritis; Family history of stroke; Family history of heart disease         Social History  Alcohol (Current some day); lives alone; Single; Tobacco (Former); Working         Review of Systems  · Constitutional  o Denies  o : chills, excessive sweating, fatigue, fever, sycope/passing out, weight gain, weight loss  · Eyes  o Denies  o : changes in vision, blurry vision, double vision  · HENT  o Denies  o : loss of hearing, ringing in the ears, ear aches, sore throat, nasal congestion, sinus pain, nose bleeds, seasonal allergies  · Cardiovascular  o Denies  o : blood clots, swollen legs, anemia, easy burising or bleeding, transfusions  · Respiratory  o Denies  o : shortness of breath, dry cough, productive cough, pneumonia, COPD  · Gastrointestinal  o Denies  o : difficulty swallowing,  reflux  · Genitourinary  o Denies  o : incontinence  · Neurologic  o Admits  o : headache, loss of balance, numbness/tingling/paresthesia , weakness  o Denies  o : seizure, stroke, tremor, falls, dizziness/vertigo, difficulty with sleep, difficulty with coordination, difficulty with dexterity  · Musculoskeletal  o Admits  o : weakness, spasms, sciatica, pain radiating in leg, low back pain  o Denies  o : neck stiffness/pain, swollen lymph nodes, muscle aches, joint pain, pain radiating in arm  · Endocrine  o Denies  o : diabetes, thyroid disorder  · Psychiatric  o Admits  o : anxiety, depression      Vitals  Date Time BP Position Site L\R Cuff Size HR RR TEMP (F) WT  HT  BMI kg/m2 BSA m2 O2 Sat HC       09/29/2020 01:56 PM        97.3         09/29/2020 02:05 /82 Sitting       208lbs 0oz 6'   28.21 2.19           Physical Examination  · Constitutional  o Appearance  o : well-nourished, well developed, alert, in no acute distress  · Respiratory  o Respiratory Effort  o : breathing unlabored  · Cardiovascular  o Peripheral Vascular System  o :   § Extremities  § : no cyanosis, clubbing or edema  · Neurologic  o Mental Status Examination  o :   § Orientation  § : grossly oriented to person, place and time  o Motor Examination  o :   § RLE Strength  § : strength normal  § RLE Motor Function  § : tone normal, no atrophy, no abnormal movements noted  § LLE Strength  § : strength normal  § LLE Motor Function  § : tone normal, no atrophy, no abnormal movements noted  o Reflexes  o :   § RLE  § : 1/4 knee and ankle reflex  § LLE  § : 2/4 knee and ankle reflex  o Sensation  o :   § Light Touch  § : sensation intact to light touch in extremities  o Gait and Station  o :   § Gait Screening  § : gait within normal limits  · Psychiatric  o Mood and Affect  o : mood normal, affect appropriate     Incision healing well. No signs of infection. Normal strength, sensation, and reflexes in legs.           Assessment  · Foraminal  stenosis of lumbar region     724.02/M99.83  right L3/4  · Lumbago/low back pain     724.3/M54.40  · Lumbar disc herniation, L3-4     722.10/M51.26  right far lateral  · Lumbar Spinal Stenosis     724.02/M48.06  · Radiculopathy, lumbosacral     724.4/M54.16  · Cervicalgia     723.1/M54.2  · Hand paresthesia     782.0/R20.2    Problems Reconciled  Plan  · Medications  o Medrol (Sunny) 4 mg oral tablets,dose pack   SIG: take by oral route as directed per package instructions for 6 days   DISP: (1) 21 ct dose-pack with 0 refills  Prescribed on 09/29/2020     o Medications have been Reconciled  o Transition of Care or Provider Policy  · Instructions  o Will followup in 2 weeks for further evaluation. Will rx Medrol dose pack. He has worsened right leg pain post surgery. Could order MRI of Lspine w/wo at next apt.   o Electronically Identified Patient Education Materials Provided Electronically  · Disposition  o Call or Return if symptoms worsen or persist.            Electronically Signed by: Nadeen Rodriguez PA-C -Author on September 29, 2020 02:37:25 PM

## 2021-05-13 NOTE — PROGRESS NOTES
Progress Note      Patient Name: Jan Garcia   Patient ID: [id number]   Sex: Male   YOB: 1985    Referring Provider: Shima Noland GYN    Visit Date: October 16, 2020    Provider: Tania Duarte PA-C   Location: [facility] [place]   Location Address: [address] [place] [address] [place], [place]  [id number]   Location Phone: [phone]          Chief Complaint     Patient is being seen today for post op follow up. Patient states his pain has improved.       History Of Present Illness     He had a right L3/4 MID for far lateral disc herniation around 6 weeks ago.  He was given a medrol dose pack at his last office visit due to ongoing pain. This has helped his pain. Gabapentin is helping pain. Soreness in the right thigh and stretching helps some.       Past Medical History  Arthritis; Back pain; Degenerative Disc Disease ; Leg pain; Migraines; Muscle cramps; Vascular disease, peripheral         Past Surgical History  Far lateral discectomy; Vasectomy         Medication List  Adderall 15 mg oral tablet; clonazepam 1 mg oral tablet; Depakote 500 mg oral tablet,delayed release (DR/EC); gabapentin 300 mg oral capsule; Medrol (Sunny) 4 mg oral tablets,dose pack; meloxicam 15 mg oral tablet         Allergy List  NO KNOWN DRUG ALLERGIES       Allergies Reconciled  Family Medical History  Family history of bleeding disorder; Family history of Arthritis; Family history of stroke; Family history of heart disease         Social History  Alcohol (Current some day); lives alone; Single; Tobacco (Former); Working         Review of Systems  · Constitutional  o Denies  o : chills, excessive sweating, fatigue, fever, sycope/passing out, weight gain, weight loss  · Eyes  o Denies  o : changes in vision, blurry vision, double vision  · HENT  o Denies  o : loss of hearing, ringing in the ears, ear aches, sore throat, nasal congestion, sinus pain, nose bleeds, seasonal  "allergies  · Cardiovascular  o Denies  o : blood clots, swollen legs, anemia, easy burising or bleeding, transfusions  · Respiratory  o Denies  o : shortness of breath, dry cough, productive cough, pneumonia, COPD  · Gastrointestinal  o Denies  o : difficulty swallowing, reflux  · Genitourinary  o Denies  o : incontinence  · Neurologic  o Denies  o : headache, seizure, stroke, tremor, loss of balance, falls, dizziness/vertigo, difficulty with sleep, numbness/tingling/paresthesia , difficulty with coordination, difficulty with dexterity, weakness  · Musculoskeletal  o Admits  o : low back pain, leg pain  o Denies  o : neck stiffness/pain, swollen lymph nodes, muscle aches, joint pain, weakness, spasms  · Endocrine  o Denies  o : diabetes, thyroid disorder  · Psychiatric  o Denies  o : anxiety, depression  · All Others Negative      Vitals  Date Time BP Position Site L\R Cuff Size HR RR TEMP (F) WT  HT  BMI kg/m2 BSA m2 O2 Sat FR L/min FiO2 HC       10/16/2020 03:08 PM       16 97.5 206lbs 8oz 6'   28.01 2.18             Physical Examination     Gait and station are wnl           Assessment  · Foraminal stenosis of lumbar region     724.02/M99.83  right L3/4  · Lumbago/low back pain     724.3/M54.40  · Lumbar disc herniation, L3-4     722.10/M51.26  right far lateral (now s/p MID)  · Lumbar Spinal Stenosis     724.02/M48.06  · Radiculopathy, lumbosacral     724.4/M54.16      Plan  · Medications  o Medications have been Reconciled  o Transition of Care or Provider Policy  · Instructions  o He has improved but still with some mild residual pain in the right leg. I will release him to return to work on 11/2/20 without restrictions. um work note faxed today while in the office. He will f/u in 4 weeks. We will call and update [facility] as well.   · Associate Tasks  o Task ID [id number] \"''Provider to Nurse: please call [employer] and update them that he will return to work on 11/2/20 without restrictions            Electronically " Signed by: Tania Duarte PA-C -Author on October 16, 2020 03:29:27 PM

## 2021-05-13 NOTE — PROGRESS NOTES
Progress Note      Patient Name: Jan Garcia   Patient ID: [id number]   Sex: Male   YOB: 1985    Referring Provider: Shima Noland GYN    Visit Date: November 11, 2020    Provider: Tania Duarte PA-C   Location: [facility]   Location Address: [address] [place] [address]  [id number]   Location Phone: [phone]          Chief Complaint     Patient is being seen today for follow up. Patient states he is still having some right leg pain.       History Of Present Illness     He had a right L3/4 MID for far lateral disc herniation two months ago.  He RTW on 11/4/20 and having some right leg pain still but back pain has improved.  He is still taking the gabapentin.  He is working without restrictions and going pretty good.       Past Medical History  Arthritis; Back pain; Degenerative Disc Disease ; Leg pain; Migraines; Muscle cramps; Vascular disease, peripheral         Past Surgical History  Far lateral discectomy; Vasectomy         Medication List  Adderall 15 mg oral tablet; clonazepam 1 mg oral tablet; Depakote 500 mg oral tablet,delayed release (DR/EC); gabapentin 300 mg oral capsule; Medrol (Sunny) 4 mg oral tablets,dose pack; meloxicam 15 mg oral tablet         Allergy List  NO KNOWN DRUG ALLERGIES       Allergies Reconciled  Family Medical History  Family history of bleeding disorder; Family history of Arthritis; Family history of stroke; Family history of heart disease         Social History  Alcohol (Current some day); lives alone; Single; Tobacco (Former); Working         Review of Systems  · Constitutional  o Denies  o : chills, excessive sweating, fatigue, fever, sycope/passing out, weight gain, weight loss  · Eyes  o Denies  o : changes in vision, blurry vision, double vision  · HENT  o Denies  o : loss of hearing, ringing in the ears, ear aches, sore throat, nasal congestion, sinus pain, nose bleeds, seasonal  allergies  · Cardiovascular  o Denies  o : blood clots, swollen legs, anemia, easy burising or bleeding, transfusions  · Respiratory  o Denies  o : shortness of breath, dry cough, productive cough, pneumonia, COPD  · Gastrointestinal  o Denies  o : difficulty swallowing, reflux  · Genitourinary  o Denies  o : incontinence  · Neurologic  o Denies  o : headache, seizure, stroke, tremor, loss of balance, falls, dizziness/vertigo, difficulty with sleep, numbness/tingling/paresthesia , difficulty with coordination, difficulty with dexterity, weakness  · Musculoskeletal  o Admits  o : muscle aches, pain radiating in leg, lbp  o Denies  o : neck stiffness/pain, swollen lymph nodes, joint pain, weakness, spasms  · Endocrine  o Denies  o : diabetes, thyroid disorder  · Psychiatric  o Admits  o : anxiety, depression  · All Others Negative      Vitals  Date Time BP Position Site L\R Cuff Size HR RR TEMP (F) WT  HT  BMI kg/m2 BSA m2 O2 Sat FR L/min FiO2 HC       11/11/2020 03:26 PM        97.3 205lbs 6oz 6'   27.85 2.18             Physical Examination     Gait and station are wnl           Assessment  · Foraminal stenosis of lumbar region     724.02/M99.83  right L3/4  · Lumbago/low back pain     724.3/M54.40  · Lumbar disc herniation, L3-4     722.10/M51.26  right far lateral (now s/p MID)  · Lumbar Spinal Stenosis     724.02/M48.06  · Radiculopathy, lumbosacral     724.4/M54.16      Plan  · Medications  o Medications have been Reconciled  o Transition of Care or Provider Policy  · Instructions  o I will see him as needed. He's doing well and back to work without restrictions.             Electronically Signed by: CAIT WallsC -Author on November 11, 2020 03:45:26 PM

## 2021-05-14 VITALS — TEMPERATURE: 97.3 F | BODY MASS INDEX: 27.82 KG/M2 | HEIGHT: 72 IN | WEIGHT: 205.37 LBS

## 2021-05-14 VITALS — HEIGHT: 72 IN | WEIGHT: 193 LBS | TEMPERATURE: 97.6 F | BODY MASS INDEX: 26.14 KG/M2

## 2021-05-14 VITALS
BODY MASS INDEX: 28.17 KG/M2 | WEIGHT: 208 LBS | SYSTOLIC BLOOD PRESSURE: 146 MMHG | DIASTOLIC BLOOD PRESSURE: 82 MMHG | TEMPERATURE: 97.3 F | HEIGHT: 72 IN

## 2021-05-14 VITALS — BODY MASS INDEX: 27.97 KG/M2 | TEMPERATURE: 97.5 F | HEIGHT: 72 IN | WEIGHT: 206.5 LBS | RESPIRATION RATE: 16 BRPM

## 2021-05-15 VITALS — TEMPERATURE: 97.6 F | HEIGHT: 72 IN | BODY MASS INDEX: 26.73 KG/M2 | WEIGHT: 197.31 LBS

## 2021-05-15 VITALS — HEIGHT: 72 IN | TEMPERATURE: 96.7 F | WEIGHT: 193.25 LBS | BODY MASS INDEX: 26.18 KG/M2

## (undated) DEVICE — SENSR O2 OXIMAX FNGR A/ 18IN NONSTR

## (undated) DEVICE — TUBING, SUCTION, 1/4" X 10', STRAIGHT: Brand: MEDLINE

## (undated) DEVICE — CANN O2 ETCO2 FITS ALL CONN CO2 SMPL A/ 7IN DISP LF

## (undated) DEVICE — THE TORRENT IRRIGATION SCOPE CONNECTOR IS USED WITH THE TORRENT IRRIGATION TUBING TO PROVIDE IRRIGATION FLUIDS SUCH AS STERILE WATER DURING GASTROINTESTINAL ENDOSCOPIC PROCEDURES WHEN USED IN CONJUNCTION WITH AN IRRIGATION PUMP (OR ELECTROSURGICAL UNIT).: Brand: TORRENT

## (undated) DEVICE — BITEBLOCK OMNI BLOC

## (undated) DEVICE — LN SMPL CO2 SHTRM SD STREAM W/M LUER

## (undated) DEVICE — KT ORCA ORCAPOD DISP STRL

## (undated) DEVICE — ADAPT CLN BIOGUARD AIR/H2O DISP